# Patient Record
Sex: FEMALE | Race: WHITE | NOT HISPANIC OR LATINO | Employment: PART TIME
[De-identification: names, ages, dates, MRNs, and addresses within clinical notes are randomized per-mention and may not be internally consistent; named-entity substitution may affect disease eponyms.]

---

## 2019-05-19 ENCOUNTER — RECORDS - HEALTHEAST (OUTPATIENT)
Dept: ADMINISTRATIVE | Facility: OTHER | Age: 39
End: 2019-05-19

## 2020-03-31 ENCOUNTER — COMMUNICATION - HEALTHEAST (OUTPATIENT)
Dept: FAMILY MEDICINE | Facility: CLINIC | Age: 40
End: 2020-03-31

## 2020-04-03 ENCOUNTER — OFFICE VISIT - HEALTHEAST (OUTPATIENT)
Dept: FAMILY MEDICINE | Facility: CLINIC | Age: 40
End: 2020-04-03

## 2020-04-03 DIAGNOSIS — E07.9 THYROID DISEASE: ICD-10-CM

## 2020-04-03 DIAGNOSIS — R06.2 WHEEZING: ICD-10-CM

## 2020-04-03 DIAGNOSIS — G43.909 MIGRAINE: ICD-10-CM

## 2020-08-31 ENCOUNTER — OFFICE VISIT - HEALTHEAST (OUTPATIENT)
Dept: FAMILY MEDICINE | Facility: CLINIC | Age: 40
End: 2020-08-31

## 2020-08-31 DIAGNOSIS — F33.0 MILD EPISODE OF RECURRENT MAJOR DEPRESSIVE DISORDER (H): ICD-10-CM

## 2020-12-16 ENCOUNTER — COMMUNICATION - HEALTHEAST (OUTPATIENT)
Dept: FAMILY MEDICINE | Facility: CLINIC | Age: 40
End: 2020-12-16

## 2020-12-16 DIAGNOSIS — F33.0 MILD EPISODE OF RECURRENT MAJOR DEPRESSIVE DISORDER (H): ICD-10-CM

## 2021-05-10 ENCOUNTER — TRANSFERRED RECORDS (OUTPATIENT)
Dept: MULTI SPECIALTY CLINIC | Facility: CLINIC | Age: 41
End: 2021-05-10

## 2021-05-10 LAB — PAP SMEAR - HIM PATIENT REPORTED: NEGATIVE

## 2021-05-27 ENCOUNTER — RECORDS - HEALTHEAST (OUTPATIENT)
Dept: ADMINISTRATIVE | Facility: CLINIC | Age: 41
End: 2021-05-27

## 2021-06-01 ENCOUNTER — RECORDS - HEALTHEAST (OUTPATIENT)
Dept: ADMINISTRATIVE | Facility: CLINIC | Age: 41
End: 2021-06-01

## 2021-06-07 NOTE — TELEPHONE ENCOUNTER
Pt has not been seen in 4 years. Have her do an E visit or phone visit to establish care. Can set up with a provider that is in clinic tomorrow to eval for any other health concerns

## 2021-06-07 NOTE — PROGRESS NOTES
"Darya Lagos is a 39 y.o. female who is being evaluated via a billable telephone visit.      The patient has been notified of following:     \"This telephone visit will be conducted via a call between you and your physician/provider. We have found that certain health care needs can be provided without the need for a physical exam.  This service lets us provide the care you need with a short phone conversation.  If a prescription is necessary we can send it directly to your pharmacy.  If lab work is needed we can place an order for that and you can then stop by our lab to have the test done at a later time.    If during the course of the call the physician/provider feels a telephone visit is not appropriate, you will not be charged for this service.\"     Patient has given verbal consent to a Telephone visit? Yes    Darya Lagos complains of    Chief Complaint   Patient presents with     Medication Management     Medication Refill       I have reviewed and updated the patient's Past Medical History, Social History, Family History and Medication List.    ALLERGIES  Patient has no known allergies.    Additional provider notes:         SUBJECTIVE   Darya Lagos is a 39 y.o. old female whom I am performing a telephone visit on.  Patient is new to me.  She has not been seen since 2016 in clinic.she would like refill for a couple prescription. She would like to get refill of sumatriptans for migraines, last use was about a year ago because she ran out. She would get bad pounding headaches a couple times a year. One tablet of Imitrex usually helps. Migraines are sometimes triggered by dehydration or not drinking enough water. Migraines have gotten better. Used to take Imitrex once a month.   She would like to ask about inhaler. She stopped taking Pulmicort a couple years ago. She would like refill albuterol inhaler, her lungs have been bothering her in the last week. Intermittent chest tightness not sure if triggered by " allergies.  Used to triggered by exercise but she's outgrowing it.   GYN is managing her synthroid prescription. Last thyroid was a couple months ago.        Review of Systems:  Negative except as noted in HPI    The following portions of the patient's history were reviewed and updated as appropriate: past medical history, past surgical history, family history, allergies, current medications and problem list.    Medical History  Active Ambulatory (Non-Hospital) Problems    Diagnosis     Arthralgia     Nausea     Myalgia     Abdominal Pain     History reviewed. No pertinent past medical history.    Surgical History  She  has no past surgical history on file.    Social History  Reviewed, and she  reports that she has never smoked. She has never used smokeless tobacco. She reports that she does not drink alcohol or use drugs.   Social History     Tobacco Use     Smoking status: Never Smoker     Smokeless tobacco: Never Used   Substance Use Topics     Alcohol use: No     Drug use: Never     Social History     Social History Narrative        Adopted a baby boy in 2018.     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      is a  who could be laid off due to COVID19.        Family History  Reviewed, and family history includes Thyroid disease in her mother.    Medications  Reviewed and reconciled    Allergies  Allergies   Allergen Reactions     Cat Dander          OBJECTIVE  Physical Exam:  Not performed as this is telephone visit         Assessment/Plan:      1. Wheezing  Triggered by allergies. Stopped pulmicort a couple years ago and hasn't needed it since  - albuterol (PROAIR HFA;PROVENTIL HFA;VENTOLIN HFA) 90 mcg/actuation inhaler; Inhale 2 puffs every 6 (six) hours as needed for wheezing.  Dispense: 1 Inhaler; Refill: 1    2. Migraine  2-3 episodes a year  - SUMAtriptan (IMITREX) 50 MG tablet; TAKE 1 TABLET AT HEADACHEONSET, MAY REPEAT IN 2 HOURS AS NEEDED, MAX 200MG/DAY   Dispense: 9 tablet; Refill: 2    3. Thyroid disease   GYN manages synthroid script.         Phone call duration:  12 minutes    Linda Sotomayor CMA

## 2021-06-11 NOTE — PROGRESS NOTES
"Darya Lagos is a 40 y.o. female who is being evaluated via a billable video visit.      The patient has been notified of following:     \"This video visit will be conducted via a call between you and your physician/provider. We have found that certain health care needs can be provided without the need for an in-person physical exam.  This service lets us provide the care you need with a video conversation.  If a prescription is necessary we can send it directly to your pharmacy.  If lab work is needed we can place an order for that and you can then stop by our lab to have the test done at a later time.    Video visits are billed at different rates depending on your insurance coverage. Please reach out to your insurance provider with any questions.    If during the course of the call the physician/provider feels a video visit is not appropriate, you will not be charged for this service.\"    Patient has given verbal consent to a Video visit? Yes  How would you like to obtain your AVS? AVS Preference: MyChart.  If dropped by the video visit, the video invitation should be sent to: Text to cell phone: [phone]  Will anyone else be joining your video visit? No        Video Start Time: 8:11 AM    -------------------------    Assessment/Plan:    Darya Lagos is a 40 y.o. female presenting for:    1. Mild episode of recurrent major depressive disorder (H)  Zoloft sent to the pharmacy.  She has done well with this in the past.  Risks and benefits discussed.  She will contact me in 4 to 6 weeks to let me know how she is doing.  She will start with 25 mg daily for 10 days and then increase to 50 mg daily thereafter if doing well.  - sertraline (ZOLOFT) 50 MG tablet; Take 0.5 tablets (25 mg total) by mouth daily for 10 days, THEN 1 tablet (50 mg total) daily.  Dispense: 85 tablet; Refill: 1        There are no discontinued medications.        Chief Complaint:  Chief Complaint   Patient presents with     Medication " Education Visit       Subjective:   Darya Lagos is a pleasant 40 y.o. female being evaluated via video visit today for the following concern/s:     Depression: Patient is a past medical history significant for mild intermittent depression.  She has been on Zoloft on and off in the past.  She is  and has a 2-year-old at home.  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  She notes that her job has changed quite a bit but she is still working.    She has a 2-year-old adopted child at home who is keeping her very busy.    She notes that recently she has been having more anxiety recently as well as feeling a bit down and sometimes irritable.  She is sleeping fairly well.    She is not interested in seeing a therapist at this time.    12 point review of systems completed and negative except for what has been described above    Social History     Tobacco Use   Smoking Status Never Smoker   Smokeless Tobacco Never Used       Current Outpatient Medications   Medication Sig     albuterol (PROAIR HFA;PROVENTIL HFA;VENTOLIN HFA) 90 mcg/actuation inhaler Inhale 2 puffs every 6 (six) hours as needed for wheezing.     cholecalciferol, vitamin D3, 1,000 unit tablet Take 1,000 Units by mouth daily.     levothyroxine (SYNTHROID, LEVOTHROID) 62.5 MCG Take 62.5 mcg by mouth daily.      sertraline (ZOLOFT) 50 MG tablet Take 0.5 tablets (25 mg total) by mouth daily for 10 days, THEN 1 tablet (50 mg total) daily.     SUMAtriptan (IMITREX) 50 MG tablet TAKE 1 TABLET AT HEADACHEONSET, MAY REPEAT IN 2 HOURS AS NEEDED, MAX 200MG/DAY         Objective:  No flowsheet data found.        There is no height or weight on file to calculate BMI.    General: No acute distress  Psych: Appropriate affect  HEENT: moist mucous membranes  Pulmonary: Breathing comfortably, speaking in complete sentences  Extremities: warm and well perfused with no edema  Skin: warm and dry with no rash       This note has been dictated and transcribed  using voice recognition software.   Any errors in transcription are unintentional and inherent to the software.      Video-Visit Details    Type of service:  Video Visit    Video End Time (time video stopped): 8:27 AM  Originating Location (pt. Location): Home    Distant Location (provider location):  [facility]     Platform used for Video Visit: Idalmis Dalton MD

## 2021-06-13 ENCOUNTER — COMMUNICATION - HEALTHEAST (OUTPATIENT)
Dept: FAMILY MEDICINE | Facility: CLINIC | Age: 41
End: 2021-06-13

## 2021-06-13 DIAGNOSIS — F33.0 MILD EPISODE OF RECURRENT MAJOR DEPRESSIVE DISORDER (H): ICD-10-CM

## 2021-06-13 NOTE — TELEPHONE ENCOUNTER
RN cannot approve Refill Request    RN can NOT refill this medication medication not on med list. Last office visit: Visit date not found Last Physical: Visit date not found Last MTM visit: Visit date not found Last visit same specialty: Visit date not found.  Next visit within 3 mo: Visit date not found  Next physical within 3 mo: Visit date not found      Homa Dahl, [facility] Connection Triage/Med Refill 12/18/2020    Requested Prescriptions   Pending Prescriptions Disp Refills     sertraline (ZOLOFT) 50 MG tablet [Pharmacy Med Name: SERTRALINE HCL 50 MG TABLET] 85 tablet 1     Sig: TAKE 0.5 TABLETS (25 MG TOTAL) BY MOUTH DAILY FOR 10 DAYS, THEN 1 TABLET (50 MG TOTAL) DAILY.       [facility] Refill Protocol  Passed - 12/16/2020  9:19 AM        Passed - PCP or prescribing provider visit in last year     Last office visit with prescriber/PCP: Visit date not found OR same dept: Visit date not found OR same specialty: Visit date not found  Last physical: Visit date not found Last MTM visit: Visit date not found   Next visit within 3 mo: Visit date not found  Next physical within 3 mo: Visit date not found  Prescriber OR PCP: Nitza Dalton MD  Last diagnosis associated with med order: 1. Mild episode of recurrent major depressive disorder (H)  - sertraline (ZOLOFT) 50 MG tablet [Pharmacy Med Name: SERTRALINE HCL 50 MG TABLET]; Take 0.5 tablets (25 mg total) by mouth daily for 10 days, THEN 1 tablet (50 mg total) daily.  Dispense: 85 tablet; Refill: 1    If protocol passes may refill for 12 months if within 3 months of last provider visit (or a total of 15 months).

## 2021-06-16 PROBLEM — E07.9 THYROID DISEASE: Status: ACTIVE | Noted: 2020-04-03

## 2021-06-17 NOTE — TELEPHONE ENCOUNTER
Telephone Encounter by Nitza Price LPN at 3/31/2020  3:25 PM     Author: Nitza Price LPN Service: -- Author Type: Licensed Nurse    Filed: 3/31/2020  3:26 PM Encounter Date: 3/31/2020 Status: Signed    : Nitza Price LPN (Licensed Nurse)

## 2021-07-31 ENCOUNTER — HEALTH MAINTENANCE LETTER (OUTPATIENT)
Age: 41
End: 2021-07-31

## 2021-08-10 ENCOUNTER — VIRTUAL VISIT (OUTPATIENT)
Dept: FAMILY MEDICINE | Facility: CLINIC | Age: 41
End: 2021-08-10
Payer: COMMERCIAL

## 2021-08-10 DIAGNOSIS — F33.0 MILD EPISODE OF RECURRENT MAJOR DEPRESSIVE DISORDER (H): Primary | ICD-10-CM

## 2021-08-10 DIAGNOSIS — G43.909 MIGRAINE WITHOUT STATUS MIGRAINOSUS, NOT INTRACTABLE, UNSPECIFIED MIGRAINE TYPE: ICD-10-CM

## 2021-08-10 PROCEDURE — 99214 OFFICE O/P EST MOD 30 MIN: CPT | Mod: GT | Performed by: FAMILY MEDICINE

## 2021-08-10 RX ORDER — ALBUTEROL SULFATE 90 UG/1
2 AEROSOL, METERED RESPIRATORY (INHALATION)
COMMUNITY
Start: 2020-04-03

## 2021-08-10 RX ORDER — LEVOTHYROXINE SODIUM 125 UG/1
62.5 TABLET ORAL
COMMUNITY

## 2021-08-10 RX ORDER — SUMATRIPTAN 100 MG/1
100 TABLET, FILM COATED ORAL
Qty: 10 TABLET | Refills: 4 | Status: SHIPPED | OUTPATIENT
Start: 2021-08-10 | End: 2021-12-20

## 2021-08-10 RX ORDER — SERTRALINE HYDROCHLORIDE 100 MG/1
100 TABLET, FILM COATED ORAL DAILY
Qty: 90 TABLET | Refills: 3 | Status: SHIPPED | OUTPATIENT
Start: 2021-08-10

## 2021-08-10 RX ORDER — SUMATRIPTAN 50 MG/1
TABLET, FILM COATED ORAL
COMMUNITY
Start: 2020-04-03 | End: 2021-11-09

## 2021-08-10 RX ORDER — SUMATRIPTAN 50 MG/1
TABLET, FILM COATED ORAL
Status: CANCELLED | OUTPATIENT
Start: 2021-08-10

## 2021-08-10 ASSESSMENT — PATIENT HEALTH QUESTIONNAIRE - PHQ9: SUM OF ALL RESPONSES TO PHQ QUESTIONS 1-9: 2

## 2021-08-10 NOTE — LETTER
August 10, 2021      Darya Coburnon  [address] [facility] [address]              To whom it may concern,    It is my medical opinion that Darya would benefit from getting massages up to once weekly for chronic whiplash.       Sincerely,      Nitza Dalton MD

## 2021-09-25 ENCOUNTER — HEALTH MAINTENANCE LETTER (OUTPATIENT)
Age: 41
End: 2021-09-25

## 2021-11-04 ENCOUNTER — NURSE TRIAGE (OUTPATIENT)
Dept: NURSING | Facility: CLINIC | Age: 41
End: 2021-11-04

## 2021-11-04 NOTE — TELEPHONE ENCOUNTER
RN Triage:    Bump on top of head x about 1 year.  No associated injury.  Pencil eraser size.  May be getting slightly bigger lately.  No associated pain or bother.  No fever.  Caller was transferred to  for appointment within next week per guideline.    Jodee Weldon RN 11/04/21 1:54 PM  [facility] Nurse Advisor    Reason for Disposition    Skin growth or mole and it is larger than a pencil eraser or increasing in size    Additional Information    Negative: Patient sounds very sick or weak to the triager    Negative: Fever and bump is tender to touch    Negative: Looks infected (e.g., spreading redness, pus, red streak)    Negative: Looks like a boil, infected sore, or deep ulcer    Negative: Caller can't describe it clearly    Negative: Patient wants to be seen    Negative: Skin growth or mole and two sides do not look the same (it is asymmetric)    Negative: Skin growth or mole and border is irregular or blurry    Negative: Skin growth or mole and changes color or it has more than one color    Protocols used: SKIN LESION - MOLES OR GROWTHS-A-[place]

## 2021-11-08 NOTE — PROGRESS NOTES
Assessment/Plan:    Scalp lesion  Suspect cyst - given location and possibly enlarging size, recommend referral to dermatology for removal, referral placed today and pt will call to schedule appointment.   - Adult Dermatology Referral       Follow up: as needed    Juliane Lemos MD  [facility]    Subjective:   Darya Lagos is a 41 year old female is here today for scalp lesion    Scalp lesion  -has been there for at least a year  -thought was maybe an ingrown hair - hasn't gone away now  -has thought maybe was cyst - has cysts in other areas of body  -feels like growing - more noticeable now when doing hair  -no hx drainage  -will hurt if pressing right on it, will get headache from that  -tried head pack once and felt nauseous later in the day    Answers for HPI/ROS submitted by the patient on 11/9/2021  How many servings of fruits and vegetables do you eat daily?: 2-3  On average, how many sweetened beverages do you drink each day (Examples: soda, juice, sweet tea, etc.  Do NOT count diet or artificially sweetened beverages)?: 0  How many minutes a day do you exercise enough to make your heart beat faster?: 10 to 19  How many days a week do you exercise enough to make your heart beat faster?: 4  How many days per week do you miss taking your medication?: 0      Patient Active Problem List   Diagnosis     Abdominal Pain     Nausea     Myalgia     Arthralgia     Thyroid disease     History reviewed. No pertinent past medical history.  History reviewed. No pertinent surgical history.  Current Outpatient Medications   Medication     albuterol (PROAIR HFA/PROVENTIL HFA/VENTOLIN HFA) 108 (90 Base) MCG/ACT inhaler     cholecalciferol 25 MCG (1000 UT) TABS     levothyroxine (SYNTHROID/LEVOTHROID) 125 MCG tablet     sertraline (ZOLOFT) 100 MG tablet     SUMAtriptan (IMITREX) 100 MG tablet     SUMAtriptan (IMITREX) 50 MG tablet     No current facility-administered medications for this visit.     Allergies    Allergen Reactions     Cat Hair Extract      Social History     Socioeconomic History     Marital status:      Spouse name: Not on file     Number of children: Not on file     Years of education: Not on file     Highest education level: Not on file   Occupational History     Not on file   Tobacco Use     Smoking status: Never Smoker     Smokeless tobacco: Never Used   Substance and Sexual Activity     Alcohol use: No     Drug use: Never     Sexual activity: Not on file   Other Topics Concern     Not on file   Social History Narrative      Adopted a baby boy in 2018.   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    is a  who could be laid off due to COVID19.      Social Determinants of Health     Financial Resource Strain: Not on file   Food Insecurity: Not on file   Transportation Needs: Not on file   Physical Activity: Not on file   Stress: Not on file   Social Connections: Not on file   Intimate Partner Violence: Not on file   Housing Stability: Not on file     Family History   Problem Relation Age of Onset     Thyroid Disease Mother      Review of systems is as stated in HPI, and the remainder of system review is otherwise negative.    Objective:     /70 (BP Location: Left arm, Patient Position: Sitting, Cuff Size: Adult Regular)   Pulse 85   Wt 83.2 kg (183 lb 6.4 oz)   SpO2 100%   BMI 27.89 kg/m      General appearance: awake, NAD  HEENT: atraumatic, normocephalic, no scleral icterus or injection  Lungs: breathing comfortably on room air  Extremities: moving all extremities  Skin: approx 0.5-1 cm lump on top of scalp (midline), no redness, overall nontender  Neuro: alert, oriented x3, CNs grossly intact, no focal deficits appreciated  Psych: normal mood/affect/behavior, answering questions appropriately, linear thought process

## 2021-11-09 ENCOUNTER — OFFICE VISIT (OUTPATIENT)
Dept: FAMILY MEDICINE | Facility: CLINIC | Age: 41
End: 2021-11-09
Payer: COMMERCIAL

## 2021-11-09 VITALS
WEIGHT: 183.4 LBS | HEART RATE: 85 BPM | BODY MASS INDEX: 27.89 KG/M2 | DIASTOLIC BLOOD PRESSURE: 70 MMHG | OXYGEN SATURATION: 100 % | SYSTOLIC BLOOD PRESSURE: 110 MMHG

## 2021-11-09 DIAGNOSIS — L98.9 SCALP LESION: Primary | ICD-10-CM

## 2021-11-09 PROCEDURE — 99213 OFFICE O/P EST LOW 20 MIN: CPT | Performed by: FAMILY MEDICINE

## 2021-12-18 DIAGNOSIS — G43.909 MIGRAINE WITHOUT STATUS MIGRAINOSUS, NOT INTRACTABLE, UNSPECIFIED MIGRAINE TYPE: ICD-10-CM

## 2021-12-20 RX ORDER — SUMATRIPTAN 100 MG/1
TABLET, FILM COATED ORAL
Qty: 10 TABLET | Refills: 4 | Status: SHIPPED | OUTPATIENT
Start: 2021-12-20

## 2021-12-20 NOTE — TELEPHONE ENCOUNTER
Routing refill request to provider for review/approval because:  PCP to decide, patient had orderd 8/10/21 10  Tabs with 4 refills  Nadine Rios RN

## 2022-08-27 ENCOUNTER — HEALTH MAINTENANCE LETTER (OUTPATIENT)
Age: 42
End: 2022-08-27

## 2023-04-22 ENCOUNTER — HEALTH MAINTENANCE LETTER (OUTPATIENT)
Age: 43
End: 2023-04-22

## 2023-09-23 ENCOUNTER — HEALTH MAINTENANCE LETTER (OUTPATIENT)
Age: 43
End: 2023-09-23

## 2024-02-10 ENCOUNTER — HEALTH MAINTENANCE LETTER (OUTPATIENT)
Age: 44
End: 2024-02-10